# Patient Record
Sex: MALE | Race: ASIAN | NOT HISPANIC OR LATINO | Employment: OTHER | URBAN - METROPOLITAN AREA
[De-identification: names, ages, dates, MRNs, and addresses within clinical notes are randomized per-mention and may not be internally consistent; named-entity substitution may affect disease eponyms.]

---

## 2021-10-04 ENCOUNTER — WALK IN (OUTPATIENT)
Dept: URGENT CARE | Age: 76
End: 2021-10-04
Attending: FAMILY MEDICINE

## 2021-10-04 VITALS
HEART RATE: 58 BPM | DIASTOLIC BLOOD PRESSURE: 72 MMHG | TEMPERATURE: 97.6 F | RESPIRATION RATE: 16 BRPM | OXYGEN SATURATION: 97 % | WEIGHT: 176 LBS | SYSTOLIC BLOOD PRESSURE: 132 MMHG

## 2021-10-04 DIAGNOSIS — K08.89 TOOTHACHE: Primary | ICD-10-CM

## 2021-10-04 PROCEDURE — 99202 OFFICE O/P NEW SF 15 MIN: CPT

## 2021-10-04 RX ORDER — TRAMADOL HYDROCHLORIDE 50 MG/1
50 TABLET ORAL EVERY 8 HOURS PRN
Qty: 12 TABLET | Refills: 0 | Status: SHIPPED | OUTPATIENT
Start: 2021-10-04 | End: 2021-10-15 | Stop reason: ALTCHOICE

## 2021-10-04 RX ORDER — AMOXICILLIN 875 MG/1
875 TABLET, COATED ORAL 2 TIMES DAILY
Qty: 20 TABLET | Refills: 0 | Status: SHIPPED | OUTPATIENT
Start: 2021-10-04 | End: 2021-10-15 | Stop reason: ALTCHOICE

## 2021-10-04 ASSESSMENT — PAIN SCALES - GENERAL: PAINLEVEL: 8

## 2021-10-07 ENCOUNTER — APPOINTMENT (OUTPATIENT)
Dept: GENERAL RADIOLOGY | Facility: HOSPITAL | Age: 76
DRG: 872 | End: 2021-10-07
Attending: EMERGENCY MEDICINE
Payer: MEDICARE

## 2021-10-07 ENCOUNTER — HOSPITAL ENCOUNTER (INPATIENT)
Facility: HOSPITAL | Age: 76
LOS: 6 days | Discharge: HOME HEALTH | DRG: 872 | End: 2021-10-13
Attending: EMERGENCY MEDICINE | Admitting: HOSPITALIST
Payer: MEDICARE

## 2021-10-07 ENCOUNTER — APPOINTMENT (OUTPATIENT)
Dept: CT IMAGING | Facility: HOSPITAL | Age: 76
DRG: 872 | End: 2021-10-07
Attending: EMERGENCY MEDICINE
Payer: MEDICARE

## 2021-10-07 DIAGNOSIS — R50.9 FEVER, UNSPECIFIED: Primary | ICD-10-CM

## 2021-10-07 PROCEDURE — 70487 CT MAXILLOFACIAL W/DYE: CPT | Performed by: EMERGENCY MEDICINE

## 2021-10-07 PROCEDURE — 99223 1ST HOSP IP/OBS HIGH 75: CPT | Performed by: HOSPITALIST

## 2021-10-07 PROCEDURE — 70355 PANORAMIC X-RAY OF JAWS: CPT | Performed by: EMERGENCY MEDICINE

## 2021-10-07 PROCEDURE — 71045 X-RAY EXAM CHEST 1 VIEW: CPT | Performed by: EMERGENCY MEDICINE

## 2021-10-07 RX ORDER — HYDROCHLOROTHIAZIDE 12.5 MG/1
12.5 CAPSULE, GELATIN COATED ORAL DAILY
Status: DISCONTINUED | OUTPATIENT
Start: 2021-10-08 | End: 2021-10-13

## 2021-10-07 RX ORDER — KETOROLAC TROMETHAMINE 30 MG/ML
15 INJECTION, SOLUTION INTRAMUSCULAR; INTRAVENOUS ONCE
Status: COMPLETED | OUTPATIENT
Start: 2021-10-07 | End: 2021-10-07

## 2021-10-07 RX ORDER — GLIMEPIRIDE 1 MG/1
1 TABLET ORAL
COMMUNITY

## 2021-10-07 RX ORDER — METOPROLOL TARTRATE 50 MG/1
50 TABLET, FILM COATED ORAL
Status: DISCONTINUED | OUTPATIENT
Start: 2021-10-07 | End: 2021-10-07

## 2021-10-07 RX ORDER — BISOPROLOL FUMARATE 5 MG/1
5 TABLET ORAL DAILY
COMMUNITY

## 2021-10-07 RX ORDER — DEXTROSE AND SODIUM CHLORIDE 5; .45 G/100ML; G/100ML
INJECTION, SOLUTION INTRAVENOUS CONTINUOUS
Status: ACTIVE | OUTPATIENT
Start: 2021-10-07 | End: 2021-10-07

## 2021-10-07 RX ORDER — KETOROLAC TROMETHAMINE 30 MG/ML
30 INJECTION, SOLUTION INTRAMUSCULAR; INTRAVENOUS EVERY 6 HOURS PRN
Status: DISCONTINUED | OUTPATIENT
Start: 2021-10-07 | End: 2021-10-07 | Stop reason: DRUGHIGH

## 2021-10-07 RX ORDER — SIMVASTATIN 20 MG
20 TABLET ORAL NIGHTLY
COMMUNITY

## 2021-10-07 RX ORDER — ACETAMINOPHEN 500 MG
1000 TABLET ORAL ONCE
Status: COMPLETED | OUTPATIENT
Start: 2021-10-07 | End: 2021-10-07

## 2021-10-07 RX ORDER — ATORVASTATIN CALCIUM 10 MG/1
10 TABLET, FILM COATED ORAL NIGHTLY
Status: DISCONTINUED | OUTPATIENT
Start: 2021-10-07 | End: 2021-10-13

## 2021-10-07 RX ORDER — SODIUM PHOSPHATE, DIBASIC AND SODIUM PHOSPHATE, MONOBASIC 7; 19 G/133ML; G/133ML
1 ENEMA RECTAL ONCE AS NEEDED
Status: DISCONTINUED | OUTPATIENT
Start: 2021-10-07 | End: 2021-10-13

## 2021-10-07 RX ORDER — TRAMADOL HYDROCHLORIDE 50 MG/1
50 TABLET ORAL EVERY 8 HOURS PRN
Status: DISCONTINUED | OUTPATIENT
Start: 2021-10-07 | End: 2021-10-13

## 2021-10-07 RX ORDER — MORPHINE SULFATE 2 MG/ML
2 INJECTION, SOLUTION INTRAMUSCULAR; INTRAVENOUS ONCE
Status: COMPLETED | OUTPATIENT
Start: 2021-10-07 | End: 2021-10-07

## 2021-10-07 RX ORDER — HYDROCHLOROTHIAZIDE 25 MG/1
12.5 TABLET ORAL DAILY
Status: DISCONTINUED | OUTPATIENT
Start: 2021-10-08 | End: 2021-10-07 | Stop reason: SDUPTHER

## 2021-10-07 RX ORDER — ACETAMINOPHEN 325 MG/1
650 TABLET ORAL EVERY 4 HOURS PRN
Status: DISCONTINUED | OUTPATIENT
Start: 2021-10-07 | End: 2021-10-13

## 2021-10-07 RX ORDER — POLYETHYLENE GLYCOL 3350 17 G/17G
17 POWDER, FOR SOLUTION ORAL DAILY PRN
Status: DISCONTINUED | OUTPATIENT
Start: 2021-10-07 | End: 2021-10-13

## 2021-10-07 RX ORDER — BISACODYL 10 MG
10 SUPPOSITORY, RECTAL RECTAL
Status: DISCONTINUED | OUTPATIENT
Start: 2021-10-07 | End: 2021-10-13

## 2021-10-07 RX ORDER — ACETAMINOPHEN 325 MG/1
650 TABLET ORAL EVERY 6 HOURS PRN
Status: DISCONTINUED | OUTPATIENT
Start: 2021-10-07 | End: 2021-10-13

## 2021-10-07 RX ORDER — HYDROCHLOROTHIAZIDE 12.5 MG/1
12.5 TABLET ORAL DAILY
COMMUNITY

## 2021-10-07 RX ORDER — METOCLOPRAMIDE HYDROCHLORIDE 5 MG/ML
10 INJECTION INTRAMUSCULAR; INTRAVENOUS EVERY 8 HOURS PRN
Status: DISCONTINUED | OUTPATIENT
Start: 2021-10-07 | End: 2021-10-13

## 2021-10-07 RX ORDER — METOPROLOL TARTRATE 50 MG/1
50 TABLET, FILM COATED ORAL
Status: DISCONTINUED | OUTPATIENT
Start: 2021-10-08 | End: 2021-10-13

## 2021-10-07 RX ORDER — RABEPRAZOLE SODIUM 20 MG/1
20 TABLET, DELAYED RELEASE ORAL DAILY
COMMUNITY

## 2021-10-07 RX ORDER — SITAGLIPTIN AND METFORMIN HYDROCHLORIDE 50; 500 MG/1; MG/1
1 TABLET, FILM COATED ORAL DAILY
COMMUNITY

## 2021-10-07 RX ORDER — SODIUM CHLORIDE 9 MG/ML
INJECTION, SOLUTION INTRAVENOUS CONTINUOUS
Status: DISCONTINUED | OUTPATIENT
Start: 2021-10-07 | End: 2021-10-13

## 2021-10-07 RX ORDER — ONDANSETRON 2 MG/ML
4 INJECTION INTRAMUSCULAR; INTRAVENOUS EVERY 6 HOURS PRN
Status: DISCONTINUED | OUTPATIENT
Start: 2021-10-07 | End: 2021-10-13

## 2021-10-07 RX ORDER — MELATONIN
3 NIGHTLY PRN
Status: DISCONTINUED | OUTPATIENT
Start: 2021-10-07 | End: 2021-10-13

## 2021-10-07 RX ORDER — HYDROCODONE BITARTRATE AND ACETAMINOPHEN 5; 325 MG/1; MG/1
1 TABLET ORAL EVERY 4 HOURS PRN
Status: DISCONTINUED | OUTPATIENT
Start: 2021-10-07 | End: 2021-10-13

## 2021-10-07 RX ORDER — HYDROCODONE BITARTRATE AND ACETAMINOPHEN 5; 325 MG/1; MG/1
2 TABLET ORAL EVERY 4 HOURS PRN
Status: DISCONTINUED | OUTPATIENT
Start: 2021-10-07 | End: 2021-10-13

## 2021-10-07 RX ORDER — KETOROLAC TROMETHAMINE 30 MG/ML
15 INJECTION, SOLUTION INTRAMUSCULAR; INTRAVENOUS EVERY 6 HOURS PRN
Status: DISPENSED | OUTPATIENT
Start: 2021-10-07 | End: 2021-10-09

## 2021-10-07 RX ORDER — TRAMADOL HYDROCHLORIDE 50 MG/1
50 TABLET ORAL EVERY 8 HOURS PRN
COMMUNITY

## 2021-10-07 RX ORDER — PANTOPRAZOLE SODIUM 40 MG/1
40 TABLET, DELAYED RELEASE ORAL
Status: DISCONTINUED | OUTPATIENT
Start: 2021-10-08 | End: 2021-10-13

## 2021-10-07 RX ORDER — AMOXICILLIN 875 MG/1
875 TABLET, COATED ORAL 2 TIMES DAILY
COMMUNITY
End: 2021-10-13

## 2021-10-07 RX ORDER — DOCUSATE SODIUM 100 MG/1
100 CAPSULE, LIQUID FILLED ORAL 2 TIMES DAILY
Status: DISCONTINUED | OUTPATIENT
Start: 2021-10-07 | End: 2021-10-13

## 2021-10-07 ASSESSMENT — ENCOUNTER SYMPTOMS
EYE REDNESS: 0
NAUSEA: 0
FEVER: 0
SHORTNESS OF BREATH: 0
SINUS PRESSURE: 0
AGITATION: 0
HEADACHES: 0
DIZZINESS: 0
SORE THROAT: 0
ABDOMINAL PAIN: 0
COUGH: 0
VOMITING: 0
ADENOPATHY: 0

## 2021-10-07 NOTE — H&P
[facility]  History and Physical     Leon Rodas Patient Status:  Emergency    1945 MRN [id number]   Location [address] [facility] [address] [facility] [address] [facility] [address] Attending Estrada Coates, [address] [facility] Day # 0 PCP No primary care provider on file.      Olivia Rfl:   amoxicillin 875 MG Oral Tab, Take 875 mg by mouth 2 (two) times daily. , Disp: , Rfl:   bisoprolol 5 MG Oral Tab, Take 5 mg by mouth daily. , Disp: , Rfl:   simvastatin 20 MG Oral Tab, Take 20 mg by mouth nightly., Disp: , Rfl:   hydrochlorothiazide 1 *   K 3.7      CO2 26.0   ALKPHO 52   AST 25   ALT 24   BILT 1.4   TP 7.5       Estimated Creatinine Clearance: 56.6 mL/min (based on SCr of 1.22 mg/dL). No results for input(s): PTP, INR in the last 168 hours.     COVID-19 Lab Results    C

## 2021-10-07 NOTE — ED PROVIDER NOTES
Patient Seen in: [facility] Emergency Department      History   Patient presents with:  Altered Mental Status  Fever  Dental Problem    Stated Complaint: AMS- tooth infection, fever    Subjective:   HPI    This is a 78 male past medical for diabetes Pulse 61   Temp (!) 101.9 °F (38.8 °C) (Oral)   Resp 22   Ht 180.3 cm (5' 11\")   Wt 80.7 kg   SpO2 99%   BMI 24.83 kg/m²         Physical Exam    GENERAL: Awake, alert oriented x3, nontoxic appearing. SKIN: Hot, warm, and dry.   HEENT: Right-sided facial CBC W/ DIFFERENTIAL[[id number]]          Abnormal            Final result                 Please view results for these tests on the individual orders.    RAINBOW DRAW LAVENDER   RAINBOW DRAW LIGHT GREEN   RAINBOW DRAW GOLD   BLOOD CULTURE   BLOOD CULTU

## 2021-10-07 NOTE — ED INITIAL ASSESSMENT (HPI)
Family reports upper R tooth infection starting Monday. PT went to  and was started on antibiotics. Fever started today and became confused.  Family reports PT now thinking he is living at previous house, etc.

## 2021-10-08 ENCOUNTER — APPOINTMENT (OUTPATIENT)
Dept: CT IMAGING | Facility: HOSPITAL | Age: 76
DRG: 872 | End: 2021-10-08
Attending: INTERNAL MEDICINE
Payer: MEDICARE

## 2021-10-08 ENCOUNTER — APPOINTMENT (OUTPATIENT)
Dept: MRI IMAGING | Facility: HOSPITAL | Age: 76
DRG: 872 | End: 2021-10-08
Attending: Other
Payer: MEDICARE

## 2021-10-08 PROBLEM — R50.9 FEVER: Status: ACTIVE | Noted: 2021-10-07

## 2021-10-08 PROCEDURE — 99291 CRITICAL CARE FIRST HOUR: CPT | Performed by: INTERNAL MEDICINE

## 2021-10-08 PROCEDURE — 70450 CT HEAD/BRAIN W/O DYE: CPT | Performed by: INTERNAL MEDICINE

## 2021-10-08 PROCEDURE — 70551 MRI BRAIN STEM W/O DYE: CPT | Performed by: OTHER

## 2021-10-08 RX ORDER — BUTALBITAL, ACETAMINOPHEN AND CAFFEINE 50; 325; 40 MG/1; MG/1; MG/1
1 TABLET ORAL EVERY 4 HOURS PRN
Status: DISCONTINUED | OUTPATIENT
Start: 2021-10-08 | End: 2021-10-13

## 2021-10-08 RX ORDER — POTASSIUM CHLORIDE 20 MEQ/1
40 TABLET, EXTENDED RELEASE ORAL EVERY 4 HOURS
Status: COMPLETED | OUTPATIENT
Start: 2021-10-08 | End: 2021-10-08

## 2021-10-08 NOTE — PLAN OF CARE
NURSING ADMISSION NOTE      Patient admitted via Cart  Oriented to room. Safety precautions initiated. Bed in low position. Call light in reach. Pt received via cart, VSS stable, c/o headache 9/10. Daughters at bedside.

## 2021-10-08 NOTE — CONSULTS
INFECTIOUS DISEASE CONSULT NOTE    Isaac Owusu Patient Status:  Inpatient    1945 MRN [id number]   [facility] 4NW-A Attending Kristal Bradley MD   Hosp Day # 1 PCP No primary care p 3.375 g, Intravenous, Q8H  •  butalbital-acetaminophen-caffeine (FIORICET) per tab 1 tablet, 1 tablet, Oral, Q4H PRN  •  potassium chloride (K-DUR M20) CR tab 40 mEq, 40 mEq, Oral, Q4H  •  influenza vaccine (PF) (FLUZONE HD) high dose for 65 yrs & older in Negative for nasal congestion, sore throat  Respiratory: Negative for cough, wheezing  Cardiovascular: Negative for chest pain, dyspnea  Gastrointestinal: Negative for vomiting, diarrhea, abdominal pain  : Negative for hematuria or flank pain  Integument 10/07/21  1656   COLORUR Yellow   CLARITY Clear   SPECGRAVITY 1.014   GLUUR Negative   BILUR Negative   KETUR Negative   BLOODURINE Large*   PHURINE 5.0   PROUR 30 *   UROBILINOGEN <2.0   NITRITE Negative   LEUUR Negative   WBCUR 1-5   RBCUR 6-10*   BACUR the facial bones. 3D shaded surface renderings are created on an independent CT scanner workstation. Dose reduction techniques were used.  Dose information is transmitted to the ACR ([facility] [employer] [facility]) Shawn Bazzi [address] ([address]) wh no suspicious erosion within the mandible and maxilla. If there is concern for a tooth abscess, CT scan would be more sensitive and specific.    Dictated by (CST): Gume Adams MD on 10/07/2021 at 5:33 PM     Finalized by (CST): Gume Adams MD on 10/07/ recommendations based on his progress.     Alycia Martínez MD  10/8/2021  2:54 PM

## 2021-10-08 NOTE — CONSULTS
[facility] [address]   Neurology; INITIAL Consultation VISIT  10/8/2021, 10:23 AM     Dang Masters Patient Status:  Inpatient    1945 MRN [id number]   [facility] 4NW-A Attending Otilio Dye MD     PCP No primary care p previous alcohol use. He reports previous drug use. ALLERGIES:  No Known Allergies    MEDICATIONS:  No current facility-administered medications on file prior to encounter.   traMADol 50 MG Oral Tab, Take 50 mg by mouth every 8 (eight) hours as needed fo edema      Neurologic Examination:  Alert with fluent speech but spoke with him via an  relatives  Oriented  Comprehension is intact  CN grossly normal  No motor or sensory lateralizing  No drift or asterixis  DTRs hyporeflexic  FTN normal  GAit CBC W/ DIFFERENTIAL    Collection Time: 10/07/21  4:24 PM   Result Value Ref Range    WBC 9.9 4.0 - 11.0 x10(3) uL    RBC 4.19 3.80 - 5.80 x10(6)uL    HGB 12.1 (L) 13.0 - 17.5 g/dL    HCT 36.0 (L) 39.0 - 53.0 %    .0 150.0 - 450.0 10(3)uL    MCV 8 Yeast Urine None Seen None Seen /HPF   EKG 12 Lead    Collection Time: 10/08/21  5:22 AM   Result Value Ref Range    Ventricular rate 61 BPM    Atrial rate 61 BPM    P-R Interval 172 ms    QRS Duration 90 ms    Q-T Interval 398 ms    QTC Calculation (Bezet (40558)    Result Date: 10/8/2021  CONCLUSION: 1. No acute intracranial findings 2. Cerebral atrophy with small vessel changes.    Dictated by (CST): Malini Swartz MD on 10/08/2021 at 9:17 AM     Finalized by (CST): Malini Swartz MD on 10/08/2021 at 9

## 2021-10-08 NOTE — PROGRESS NOTES
[id number] pt dtr pt feels warm, recheck temp 101.9. Tylenol given, ice packs under axilla. Ekg done d/t dtr saying pt c/o chest heaviness. VS stable other than elevated temperature. EKG NSR. C/O headache tramadol given. Belched and statesd he feels better.

## 2021-10-08 NOTE — PROGRESS NOTES
[facility] Pharmacy Note:  Age Based Dose Adjustment    Evie Cuenca has been prescribed ketorolac (TORADOL) 30 mg IV every 6 hours prn. Patient is >71 years old therefore the dose has been adjusted to 15 mg IV every 6 hours prn.       Thank you,  Luda Michelle

## 2021-10-08 NOTE — CODE DOCUMENTATION
[facility]  RAPID RESPONSE NOTE     Mindy Laming Patient Status:  Inpatient    1945 MRN [id number]   [facility] 4NW-A Attending Prachi Cordero MD   Hosp Day # 1 PCP No primary care provider on file.      Reason for RRT: Chest pain 3-D RENDERING:  Not applicable       PATIENT STATED HISTORY:(As transcribed by Technologist) Román Ross has right facial pain with fever and AMS.         CONTRAST USED:  75cc of Omnipaque 350       FINDINGS:     SINUSES:  Mucous retention cyst or polyp in t rapid response.     Critical care time: 35 Minutes of critical care time spent from 6:13 AM to 6:48 AM    Dr. Philip Shankar will follow from morning today    Gavin Turner MD  10/8/2021

## 2021-10-08 NOTE — PAYOR COMM NOTE
--------------  ADMISSION REVIEW     Payor: [id number] #:  [id number]  Authorization Number: [id number]    Admit date: 10/7/21  Admit time:  6:59 PM       REVIEW DOCUMENTATION:     ED Provider Notes      ED Provider Notes signed by Sc use: Not Currently    Drug use: Not Currently             Review of Systems    Positive for stated complaint: AMS- tooth infection, fever  Other systems are as noted in HPI. Constitutional and vital signs reviewed.       All other systems reviewed and nega PROCALCITONIN - Normal    Narrative:     Resulted by: batch: K, CO2, CL, NA, ALB, TBIL, ALT, AST, ALP, CA, CREA, BUN, GLUC,    LACTIC ACID, PLASMA - Normal   RAPID SARS-COV-2 BY PCR - Normal   CBC WITH DIFFERENTIAL WITH PLATELET    Narrative:      The fol pm    Follow-up:  No follow-up provider specified.         Medications Prescribed:  Current Discharge Medication List                          Hospital Problems             Present on Admission           ICD-10-CM Noted POA    * (Principal) Fever, unspecifi syncope. Past Medical History:  Past Medical History:   Diagnosis Date   • Diabetes ([facility] [address]    • Essential hypertension    • Hyperlipidemia         Past Surgical History: History reviewed. No pertinent surgical history.     Social History:  reports that he S1, S2. Regular rate and rhythm. No murmurs, rubs or gallops. Equal pulses. Chest and Back: No tenderness or deformity. Abdomen: Soft, nontender, nondistended. Positive bowel sounds. No rebound, guarding or organomegaly.   Neurologic: No focal neurologi isolation: Yes    Plan of care discussed with patient at bedside.     Cornelius Vazquez DO  10/7/2021                      Electronically signed by Pascual Maldonado DO on 10/8/2021 12:57 AM         MEDICATIONS ADMINISTERED IN LAST 1 DAY: Date Action Dose Route User    10/8/2021 0511 Given  Intravenous Maryjo Bella RN      pantoprazole (PROTONIX) EC tab 40 mg     Date Action Dose Route User    10/8/2021 0523 Given  Oral Maryjo Bella RN      Piperacillin Sod-Tazobactam So (Aleta Goodness) 3.    Reason for RRT: Chest pain, headache, fever     Rapid response called by RN for fever of 101.9 °F with headache and chest pain. Chest pain relieved by burping according to patient. Denies any diaphoresis. Complains of nausea.  Complains of intractable FINDINGS:     SINUSES:  Mucous retention cyst or polyp in the right maxillary sinus.  There is minimal mucosal thickening of the ethmoid air cells.  The remainder of the paranasal sinuses are clear.    NASAL FOSSA:  No mass, fracture, or significant septa patient relates that about this weekend he was chewing on a tough beef jerky and felt pain in the right molar. It was found to be possibly infected at an urgent care on Monday and was given antibiotics and tramadol for pain.   The right side of the jaw and Ref Range     Rapid SARS-CoV-2 by PCR Not Detected Not Detected   RAINBOW DRAW LAVENDER     Collection Time: 10/07/21  4:24 PM   Result Value Ref Range     Hold Lavender Auto Resulted     RAINBOW DRAW LIGHT GREEN     Collection Time: 10/07/21  4:24 PM   Re Basophil Absolute 0.08 0.00 - 0.20 x10(3) uL     Immature Granulocyte Absolute 0.06 0.00 - 1.00 x10(3) uL     Neutrophil % 68.7 %     Lymphocyte % 17.5 %     Monocyte % 10.7 %     Eosinophil % 1.7 %     Basophil % 0.8 %     Immature Granulocyte % 0.6 %   E CO2 27.0 21.0 - 32.0 mmol/L     Anion Gap 5 0 - 18 mmol/L     BUN 17 7 - 18 mg/dL     Creatinine 1.10 0.70 - 1.30 mg/dL     Calcium, Total 7.5 (L) 8.5 - 10.1 mg/dL     Calculated Osmolality 284 275 - 295 mOsm/kg     GFR, Non- 66 >=60     GF expressed willingness to get LP     MRI to be ordered as well  Just plain Tylenol ES for headache for now

## 2021-10-09 ENCOUNTER — APPOINTMENT (OUTPATIENT)
Dept: GENERAL RADIOLOGY | Facility: HOSPITAL | Age: 76
DRG: 872 | End: 2021-10-09
Attending: Other
Payer: MEDICARE

## 2021-10-09 PROCEDURE — 009U3ZX DRAINAGE OF SPINAL CANAL, PERCUTANEOUS APPROACH, DIAGNOSTIC: ICD-10-PCS | Performed by: HOSPITALIST

## 2021-10-09 PROCEDURE — 62328 DX LMBR SPI PNXR W/FLUOR/CT: CPT | Performed by: OTHER

## 2021-10-09 RX ORDER — DEXTROSE MONOHYDRATE 25 G/50ML
50 INJECTION, SOLUTION INTRAVENOUS
Status: DISCONTINUED | OUTPATIENT
Start: 2021-10-09 | End: 2021-10-13

## 2021-10-09 NOTE — PROGRESS NOTES
[facility]     Hospitalist Progress Note     [facility] Main Patient Status:  Inpatient    1945 MRN [id number]   [facility] 4NW-A Attending Bushra Lieberman MD   Hosp Day # 2 PCP No primary care provider on file.      Chief Complaint: Annalisa Haro Markers  No results for input(s): CRP, SU, LDH, DDIMER in the last 168 hours. Imaging: Imaging data reviewed in Epic.     Medications:   • Insulin Aspart Pen  1-5 Units Subcutaneous TID CC and HS   • ampicillin-sulbactam  3 g Intravenous Q8H   • pantopr

## 2021-10-09 NOTE — PLAN OF CARE
Pt alert and oriented x4, confused at times per family, c/o pain to the right face, prn given per MAR, applied ice pack for comfort. IVF infusing and receiving IVabx. daughter at bedside. Will monitor.

## 2021-10-09 NOTE — PROGRESS NOTES
[id number] [facility] Neurology Progress Note    Isaac Owusu Patient Status:  Inpatient    1945 MRN [id number]   [facility] 4NW-A Attending Kristal Bradley MD   Hosp Day # 2 PCP No primary care provider on file.      NEUROLOGY   Attend 9:09 AM   Result Value Ref Range    POC Glucose 115 (H) 70 - 99 mg/dL   Cell Count, CSF    Collection Time: 10/09/21 11:31 AM   Result Value Ref Range    Total Volume CSF 8.5 mL    CSF TUBE # 4     Color CSF Colorless Colorless    Clarity CSF Clear Clear vs parameningeal phenomena secondary to odontogenic infection.   Definitely not consistent with Bacterial infection      Discussion/Recommendations:     Continue antibiotic coverage for dental issue  ID following    Discussed case with care team  Agree with Intravenous, Continuous  acetaminophen (TYLENOL) tab 650 mg, 650 mg, Oral, Q6H PRN  acetaminophen (TYLENOL) tab 650 mg, 650 mg, Oral, Q4H PRN   Or  HYDROcodone-acetaminophen (NORCO) 5-325 MG per tab 1 tablet, 1 tablet, Oral, Q4H PRN   Or  HYDROcodone-aceta Intact to light touch  Gait: Deferred      Imaging/Diagnostics:  MRI Brain 10/8/21:  CONCLUSION:     1. Examination is incomplete.  The patient was unable to tolerate the exam.   2. No evidence of an acute infarct.    3. Minimal FLAIR abnormalities the whit

## 2021-10-09 NOTE — PLAN OF CARE
Assumed care @ 0730. Patient c/o moderate-severe headache. Temp max 101.1. Patient alert, oriented x4. Dr. Rehana Cadet notified regarding fever, Tylenol gvien as needed. Lumbar puncture done in IR, procedure tolerated well, puncture site clean, dry and intact.  P

## 2021-10-09 NOTE — PROGRESS NOTES
INFECTIOUS DISEASE PROGRESS NOTE    Dang Masters Patient Status:  Inpatient    1945 MRN [id number]   [facility] 4NW-A Attending Otilio Dye MD   Hosp Day # 2 PCP No primary care HYDROcodone-acetaminophen (NORCO) 5-325 MG per tab 2 tablet, 2 tablet, Oral, Q4H PRN  •  ondansetron (ZOFRAN) injection 4 mg, 4 mg, Intravenous, Q6H PRN  •  metoclopramide (REGLAN) injection 10 mg, 10 mg, Intravenous, Q8H PRN  •  docusate sodium (COLACE) c 4. 19 3.64*   HGB 12.1* 10.8*   HCT 36.0* 30.5*   MCV 85.9 83.8   MCH 28.9 29.7   MCHC 33.6 35.4   RDW 12.0 12.1   NEPRELIM 6.81 5.66   WBC 9.9 8.6   .0 156.0     Recent Labs   Lab 10/07/21  1624 10/08/21  0614 10/08/21  2132   GLU 99 114*  --    BUN HISTORY: (As transcribed by Technologist)  Per patients family member he had a bridge installed pt bit down and injured himself, he now has an infection in his mouth. FINDINGS:  There is cerebral atrophy with symmetric prominence of the ventricles.  Ther lesion or fracture  ORBITS:  No visible mass, hematoma, edema or fracture. CAVERNOUS SINUS:  Symmetric appearance with no visible lesion. SALIVARY GLANDS:  The parotid and submandibular glands are unremarkable.   OTHER:  The nasopharynx, oropharynx, and o congestion along with interstitial abnormalities the lungs are noted. This can be due to fluid overload. Atypical infectious process is also of consideration.     Dictated by (CST): Tom Francois MD on 10/07/2021 at 5:19 PM     Finalized by (CST): Betzaida

## 2021-10-10 RX ORDER — METOPROLOL TARTRATE 50 MG/1
50 TABLET, FILM COATED ORAL
Status: DISCONTINUED | OUTPATIENT
Start: 2021-10-10 | End: 2021-10-10

## 2021-10-10 RX ORDER — HYDROCHLOROTHIAZIDE 12.5 MG/1
12.5 CAPSULE, GELATIN COATED ORAL DAILY
Status: DISCONTINUED | OUTPATIENT
Start: 2021-10-10 | End: 2021-10-10

## 2021-10-10 RX ORDER — AMLODIPINE BESYLATE 5 MG/1
10 TABLET ORAL DAILY
Status: DISCONTINUED | OUTPATIENT
Start: 2021-10-10 | End: 2021-10-13

## 2021-10-10 RX ORDER — NAPROXEN 500 MG/1
500 TABLET ORAL 2 TIMES DAILY PRN
Status: DISCONTINUED | OUTPATIENT
Start: 2021-10-10 | End: 2021-10-13

## 2021-10-10 NOTE — PLAN OF CARE
Patient slightly drowsy, easily arousable, oriented x4. Patient c/o severe pain on back of the head, Tylenol given as needed, with mild relief. 'K-240'Z mmHg systolic, Dr. Amanda Diaz notified, Norvasc started. Patient with poor oral intake.  Small blister

## 2021-10-10 NOTE — PROGRESS NOTES
[facility]     Hospitalist Progress Note     Kris Frazier Patient Status:  Inpatient    1945 MRN [id number]   [facility] 4NW-A Attending Loyd Parra MD   Hosp Day # 3 PCP No primary care provider on file.      Chief Complaint: fev input(s): CK in the last 168 hours. Inflammatory Markers  No results for input(s): CRP, SU, LDH, DDIMER in the last 168 hours. Imaging: Imaging data reviewed in Epic.     Medications:   • Insulin Aspart Pen  1-5 Units Subcutaneous TID CC and HS   • a

## 2021-10-10 NOTE — PLAN OF CARE
Pt alert and oriented x4, vitals stable, afebrile, c/o headache, prn tylenol given per MAR. IVF infusing, continued IV abx, daughter at bedside, will monitor.

## 2021-10-10 NOTE — PROGRESS NOTES
[facility]  Neurology  Notes  Affiliated with [facility]  10/10/2021, 10:02 AM     Kris Frazier Patient Status:  Inpatient    1945 MRN [id number]   [facility] 4NW-A Attending Loyd Parra MD   [facility] at 10/09/21 1525       Objective:  /58 (BP Location: Left arm)   Pulse 52   Temp 98.2 °F (36.8 °C) (Oral)   Resp 18   Ht 71\"   Wt 183 lb 4.8 oz (83.1 kg)   SpO2 98%   BMI 25.57 kg/m²      Intake/Output Summary (Last 24 hours) at 10/10/2021 1002  Las AM   Result Value Ref Range    Neutrophils CSF 2 %    Lymphocytes CSF 47 %    Mono/Macrophages CSF 51 %    Eosinophils CSF 0 %    Basophil CSF 0 %    Total Cells Counted 100    PATH COMMENT CSF    Collection Time: 10/09/21 11:31 AM   Result Value Ref Range

## 2021-10-10 NOTE — PROGRESS NOTES
INFECTIOUS DISEASE PROGRESS NOTE    Niharika Frost Patient Status:  Inpatient    1945 MRN [id number]   [facility] 4NW-A Attending Loida Benson MD   Hosp Day # 3 PCP No primary care HYDROcodone-acetaminophen (NORCO) 5-325 MG per tab 2 tablet, 2 tablet, Oral, Q4H PRN  •  ondansetron (ZOFRAN) injection 4 mg, 4 mg, Intravenous, Q6H PRN  •  metoclopramide (REGLAN) injection 10 mg, 10 mg, Intravenous, Q8H PRN  •  docusate sodium (COLACE) c 10/08/21  0614 10/08/21  2132   GLU 99 114*  --    BUN 14 17  --    CREATSERUM 1.22 1.10  --    GFRAA 67 76  --    GFRNAA 58* 66  --    CA 8.3* 7.5*  --    ALB 3.1*  --   --    * 136  --    K 3.7 3.2* 3.9    104  --    CO2 26.0 27.0  --    ALKP his mouth. FINDINGS:  There is cerebral atrophy with symmetric prominence of the ventricles.  There are patchy areas of low attenuation in the periventricular and deep white matter which are nonspecific but most consistent with small vessel ischemic vleasco The parotid and submandibular glands are unremarkable. OTHER:  The nasopharynx, oropharynx, and oral cavity are unremarkable. No lymphadenopathy. No enhancing lesion identified.              CONCLUSION:  There is no evidence of abscess or definite osteom Dictated by (CST): Michelle Kennedy MD on 10/07/2021 at 5:19 PM     Finalized by (CST): Michelle Kennedy MD on 10/07/2021 at 5:20 PM            ASSESSMENT:  1. R odontogenic infection- no jace abscess per imaging but does not rule it out  a.  Had bee

## 2021-10-11 RX ORDER — METOCLOPRAMIDE HYDROCHLORIDE 5 MG/ML
10 INJECTION INTRAMUSCULAR; INTRAVENOUS ONCE
Status: COMPLETED | OUTPATIENT
Start: 2021-10-11 | End: 2021-10-11

## 2021-10-11 RX ORDER — DIPHENHYDRAMINE HYDROCHLORIDE 50 MG/ML
25 INJECTION INTRAMUSCULAR; INTRAVENOUS ONCE
Status: COMPLETED | OUTPATIENT
Start: 2021-10-11 | End: 2021-10-11

## 2021-10-11 NOTE — PLAN OF CARE
Pt A&O x4. Pt denies difficulty breathing. C/o headache, R ear pain. Pt states that laying flat improves pain. Neuro saw pt today, recommending anesthesia to see pt for possible blood patch. Benadryl and reglan given for headache per neuro.  IV abx and IV a INTERVENTIONS:  - Encourage pt to monitor pain and request assistance  - Assess pain using appropriate pain scale  - Administer analgesics based on type and severity of pain and evaluate response  - Implement non-pharmacological measures as appropriate and

## 2021-10-11 NOTE — PLAN OF CARE
Pt alert and oriented x4, vitals stable, c/o headache when sitting, relief when lying down per family. Neuro aware, per Dr Rocío Berry to keep pt NPO for procedure in am. IVF infusing, continue IVabx, family at bedside, will monitor closely.

## 2021-10-11 NOTE — PROGRESS NOTES
INFECTIOUS DISEASE PROGRESS NOTE    Colveraraseli Smith Patient Status:  Inpatient    1945 MRN [id number]   [facility] 4NW-A Attending Mikel Shetty MD   Hosp Day # 4 PCP No primary care mg, Oral, Q4H PRN **OR** HYDROcodone-acetaminophen (NORCO) 5-325 MG per tab 1 tablet, 1 tablet, Oral, Q4H PRN **OR** HYDROcodone-acetaminophen (NORCO) 5-325 MG per tab 2 tablet, 2 tablet, Oral, Q4H PRN  •  ondansetron (ZOFRAN) injection 4 mg, 4 mg, Madhu Lux 12.0 12.1   NEPRELIM 6.81 5.66   WBC 9.9 8.6   .0 156.0     Recent Labs   Lab 10/07/21  1624 10/08/21  0614 10/08/21  2132   GLU 99 114*  --    BUN 14 17  --    CREATSERUM 1.22 1.10  --    GFRAA 67 76  --    GFRNAA 58* 66  --    CA 8.3* 7.5*  -- member he had a bridge installed pt bit down and injured himself, he now has an infection in his mouth. FINDINGS:  There is cerebral atrophy with symmetric prominence of the ventricles.  There are patchy areas of low attenuation in the periventricular an fracture. CAVERNOUS SINUS:  Symmetric appearance with no visible lesion. SALIVARY GLANDS:  The parotid and submandibular glands are unremarkable. OTHER:  The nasopharynx, oropharynx, and oral cavity are unremarkable. No lymphadenopathy.   No enhancing l noted.  This can be due to fluid overload. Atypical infectious process is also of consideration.     Dictated by (CST): Raheem Rogel MD on 10/07/2021 at 5:19 PM     Finalized by (CST): Raheem Rogel MD on 10/07/2021 at 5:20 PM            ASSESSM

## 2021-10-11 NOTE — PROGRESS NOTES
[facility]     Hospitalist Progress Note     Niharika Frost Patient Status:  Inpatient    1945 MRN [id number]   [facility] 4NW-A Attending Loida Benson MD   Hosp Day # 4 PCP No primary care provider on file.      Chief Complaint: con last 168 hours. Inflammatory Markers  No results for input(s): CRP, SU, LDH, DDIMER in the last 168 hours. Imaging: Imaging data reviewed in Epic.     Medications:   • amLODIPine  10 mg Oral Daily   • Insulin Aspart Pen  1-5 Units Subcutaneous TID CC

## 2021-10-11 NOTE — PROGRESS NOTES
[id number] [facility] Neurology Progress Note    Ben Ruiz Patient Status:  Inpatient    1945 MRN [id number]   [facility] 4NW-A Attending Milka Ventura MD   Hosp Day # 4 PCP No primary care provider on file.        Chief Complaint: Component Value Date    [facility]              Imaging/Diagnostic:    • amLODIPine  10 mg Oral Daily   • Insulin Aspart Pen  1-5 Units Subcutaneous TID CC and HS   • acyclovir  10 mg/kg (Ideal) Intravenous Q8H   • ampicillin-sulbactam  3 g Intr

## 2021-10-12 ENCOUNTER — TELEPHONE (OUTPATIENT)
Dept: SCHEDULING | Age: 76
End: 2021-10-12

## 2021-10-12 ENCOUNTER — APPOINTMENT (OUTPATIENT)
Dept: CT IMAGING | Facility: HOSPITAL | Age: 76
DRG: 872 | End: 2021-10-12
Attending: HOSPITALIST
Payer: MEDICARE

## 2021-10-12 PROCEDURE — 70450 CT HEAD/BRAIN W/O DYE: CPT | Performed by: HOSPITALIST

## 2021-10-12 RX ORDER — ACETAMINOPHEN 325 MG/1
650 TABLET ORAL EVERY 4 HOURS PRN
Qty: 30 TABLET | Refills: 0 | Status: SHIPPED | OUTPATIENT
Start: 2021-10-12

## 2021-10-12 RX ORDER — AMOXICILLIN AND CLAVULANATE POTASSIUM 875; 125 MG/1; MG/1
1 TABLET, FILM COATED ORAL 2 TIMES DAILY
Qty: 20 TABLET | Refills: 0 | Status: SHIPPED | OUTPATIENT
Start: 2021-10-12 | End: 2021-10-22

## 2021-10-12 NOTE — PROGRESS NOTES
[id number] [facility] Neurology Progress Note    Roxann Calabrese Patient Status:  Inpatient    1945 MRN [id number]   [facility] 4NW-A Attending Paula Cherry MD   Hosp Day # 5 PCP No primary care provider on file.        Chief Complaint: Imaging/Diagnostic:    • amLODIPine  10 mg Oral Daily   • Insulin Aspart Pen  1-5 Units Subcutaneous TID CC and HS   • acyclovir  10 mg/kg (Ideal) Intravenous Q8H   • ampicillin-sulbactam  3 g Intravenous Q8H   • pantoprazole  40 mg Oral QAM AC   •

## 2021-10-12 NOTE — CDS QUERY
Uncertain Diagnosis  CLINICAL DOCUMENTATION CLARIFICATION FORM  Dear Doctor:  Clinical information (provided below) indicates an unknown status of a documented diagnosis.  For accurate ICD-10-CM code assignment to reflect severity of illness and risk of mor sepsis due to above  5. HA- assume due to #1  10/9 PN: Dental infection-we will continue on Unasyn. CT scan negative for abscess orosteomyelitis. 10/10 PN Moy Herrera): 1.R odontogenic infection- no jace abscess per imaging but does not rule it out a.  Had been

## 2021-10-12 NOTE — DISCHARGE SUMMARY
[facility] HOSPITALIST  DISCHARGE SUMMARY     Anderson Bullard Patient Status:  Inpatient    1945 MRN [id number]   [facility] 4NW-A Attending Ange Rider MD   Hosp Day # 5 PCP No primary care provider on file.      Date of Admission: 10/7/2021 SITagliptin-metFORMIN HCl      Take 1 tablet by mouth daily. Refills: 0     metFORMIN 500 MG Tabs  Commonly known as: GLUCOPHAGE      Take 500 mg by mouth 2 (two) times daily with meals.    Refills: 0     RABEprazole Sodium 20 MG Tbec  Commonly known as:

## 2021-10-12 NOTE — PROGRESS NOTES
Pt Axox4. Lying in bed prefers to be flat, helps with pain. C/O headache 5/10 tylenol given, asleep. Per daughter, headache is better. IV abx and antivral given per [facility]. Using urinal to void. No complaints.

## 2021-10-12 NOTE — PROGRESS NOTES
INFECTIOUS DISEASE PROGRESS NOTE    Kris Frazier Patient Status:  Inpatient    1945 MRN [id number]   [facility] 4NW-A Attending Loyd Parra MD   Hosp Day # 5 PCP No primary care MG per tab 1 tablet, 1 tablet, Oral, Q4H PRN **OR** HYDROcodone-acetaminophen (NORCO) 5-325 MG per tab 2 tablet, 2 tablet, Oral, Q4H PRN  •  ondansetron (ZOFRAN) injection 4 mg, 4 mg, Intravenous, Q6H PRN  •  metoclopramide (REGLAN) injection 10 mg, 10 mg, Recent Labs   Lab 10/07/21  1624 10/08/21  0614 10/08/21  2132   GLU 99 114*  --    BUN 14 17  --    CREATSERUM 1.22 1.10  --    GFRAA 67 76  --    GFRNAA 58* 66  --    CA 8.3* 7.5*  --    ALB 3.1*  --   --    * 136  --    K 3.7 3.2* 3.9    injured himself, he now has an infection in his mouth. FINDINGS:  There is cerebral atrophy with symmetric prominence of the ventricles.  There are patchy areas of low attenuation in the periventricular and deep white matter which are nonspecific but mos appearance with no visible lesion. SALIVARY GLANDS:  The parotid and submandibular glands are unremarkable. OTHER:  The nasopharynx, oropharynx, and oral cavity are unremarkable. No lymphadenopathy. No enhancing lesion identified.              [facility] Atypical infectious process is also of consideration.     Dictated by (CST): Melody Nicole MD on 10/07/2021 at 5:19 PM     Finalized by (CST): Melody Nicole MD on 10/07/2021 at 5:20 PM            ASSESSMENT:  1. R odontogenic infection- no jace a

## 2021-10-12 NOTE — PAYOR COMM NOTE
--------------  CONTINUED STAY REVIEW    Payor: [id number] #:  [id number]  Authorization Number: [id number]    Admit date: 10/7/21  Admit time:  6:59 PM    Admitting Physician: Cristofer Romo MD  Attending Physician:  MD ANTONIO Urias Route User    10/12/2021 1125 Given  Oral Axel Ricci RN    10/11/2021 1701 Given  Oral Jessica Jacinto RN      pantoprazole (PROTONIX) EC tab 40 mg     Date Action Dose Route User    10/12/2021 6620 Given  Oral Mir Damon RN      0.9% NaCl infu sitting up since yesterday. No more fevers. No dental pain, although reports that his whole head hurst a times, difficult to say if it is the head or the mandible.  MS has been nl             ASSESSMENT:  1. R odontogenic infection- no jace abscess per munir nonsensical speech noted, mouth is dry         Objective/Physical Exam:     Vital Signs:  Blood pressure 149/69, pulse 50, temperature 97.8 °F (36.6 °C), temperature source Oral, resp.  rate 18, height 71\", weight 183 lb 4.8 oz (83.1 kg), SpO2 97 %.     Ge subsequently noted facial swelling and tooth pain.  He presented to the ED for fever of 101, confusion and headache         Current complaints: family at bedside, reports his headache is controlled at a 2/10 with naproxen while in bed, he has some increase reglan 10 mg IV for his headache x1 given on 10/12 - family reports no relief of headache with this   Tylenol and Naproxen prn headache     Headache 2/10 controlled with naproxen   Discussed with Dr Daryl Mares   neurology will sign off , please call with any qu

## 2021-10-13 VITALS
RESPIRATION RATE: 18 BRPM | HEIGHT: 71 IN | WEIGHT: 183.31 LBS | BODY MASS INDEX: 25.66 KG/M2 | DIASTOLIC BLOOD PRESSURE: 77 MMHG | SYSTOLIC BLOOD PRESSURE: 182 MMHG | HEART RATE: 54 BPM | OXYGEN SATURATION: 97 % | TEMPERATURE: 97 F

## 2021-10-13 RX ORDER — AMLODIPINE BESYLATE 10 MG/1
10 TABLET ORAL DAILY
Qty: 30 TABLET | Refills: 0 | Status: SHIPPED | OUTPATIENT
Start: 2021-10-14

## 2021-10-13 NOTE — PROGRESS NOTES
[facility]     Hospitalist Progress Note     Bennie Gains Patient Status:  Inpatient    1945 MRN [id number]   [facility] 4NW-A Attending Rex Canada MD   Hosp Day # 6 PCP No primary care provider on file.      Chief Complaint: 10/07/21  1624   PCT 0.05       Cardiac  Recent Labs   Lab 10/08/21  0614   TROP <0.045       Creatinine Kinase  No results for input(s): CK in the last 168 hours.     Inflammatory Markers  Recent Labs   Lab 10/12/21  1721   CRP 0.64*       Imaging: Imaging

## 2021-10-13 NOTE — PLAN OF CARE
Patient received this evening alert and oriented x 4, daughter at bedside, lungs clear on room air, abdomen soft, bowel sounds present, voiding adequate amounts, headache 4/10 while sitting up at side of bed, IVF infusing, blood pressure elevated, will con

## 2021-10-13 NOTE — PROGRESS NOTES
Left perfect serve message for Dr Fay Moreno, for family member states, hes a DR Lukas Gaytan, and wants to be updated by md. Also left message with Dr Fay Moreno in regards to hypertnesion in 160's, but patient heart rate 55, so lopressor held, and wanted to see if we coul

## 2021-10-13 NOTE — PROGRESS NOTES
[id number] [facility] Neurology Progress Note    Bharati Pontiff Patient Status:  Inpatient    1945 MRN [id number]   [facility] 4NW-A Attending Samia Camacho MD   Hosp Day # 6 PCP No primary care provider on file.      CC: fever, HA, Intravenous, Continuous  acetaminophen (TYLENOL) tab 650 mg, 650 mg, Oral, Q6H PRN  acetaminophen (TYLENOL) tab 650 mg, 650 mg, Oral, Q4H PRN   Or  HYDROcodone-acetaminophen (NORCO) 5-325 MG per tab 1 tablet, 1 tablet, Oral, Q4H PRN   Or  HYDROcodone-aceta today      Imaging/Diagnostics:  [address] [place] [address] 10/12/21: CONCLUSION:  No acute intracranial pathology.       Labs:  Serum:  ESR 41  CRP 0.64  B12 315  TPO ab neg    CSF:     Enterovirus, WNV, neg in CSF      Assessment:   Headache, fevers  Mild CSF pleocytosis, possi

## 2021-10-13 NOTE — PHYSICAL THERAPY NOTE
PHYSICAL THERAPY EVALUATION - INPATIENT     Room Number: 328/108-I  Evaluation Date: 10/13/2021  Type of Evaluation: Initial  Physician Order: PT Eval and Treat    Presenting Problem: fever, AMS  Reason for Therapy: Mobility Dysfunction and Discharge Unable to rate  Location: neck  Management Techniques:  Activity promotion;Repositioning    COGNITION  · Following Commands:  follows one step commands with increased time and follows one step commands with repetition  · Initiation: cues to initiate tasks needed. Pt also very [place]. Pt supine-sit slowly with supervision. Pt demonstrates fair static/dynamic sitting balance at EOB. Pt educated on sequencing with RW. Pt sit-stand with RW and MIN assist for correction of retropulsion.  Pt ambulated 200ft with RW a further address above mentioned impairments and functional mobility deficits in order to return to independent prior level of function.    DISCHARGE RECOMMENDATIONS  PT Discharge Recommendations: Home with home health PT/OT    PLAN  PT Treatment Plan: Bed m

## 2021-10-13 NOTE — DISCHARGE SUMMARY
[facility] [place] HOSPITALIST  DISCHARGE SUMMARY     Wes Carrizales Patient Status:  Inpatient    1945 MRN [id number]   [facility] 4NW-A Attending Benito Schafer MD   Hosp Day # 6 PCP No primary care provider on file.      Date of Admission: 10/7/2 Follow-Up Recommendation:  LACE 29-58:  Moderate Risk of readmission after discharge from the hospital.    Procedures during hospitalization:   • LP    Incidental or significant findings and recommendations (brief descriptions):  • none    Lab/Test results Pain.   Refills: 0        STOP taking these medications    amoxicillin 875 MG Tabs  Commonly known as: AMOXIL              Where to Get Your Medications      Please  your prescriptions at the location directed by your doctor or nurse    Bring a qing

## 2021-10-13 NOTE — CM/SW NOTE
[facility] accepted    [address] [facility], [address] [place] [address]  Phone: [phone]  Fax: [phone]

## 2021-10-13 NOTE — PLAN OF CARE
NURSING DISCHARGE NOTE    Discharged Home via Wheelchair. Accompanied by Family member and Support staff  Belongings Taken by patient/family. Discharge instructions given and explained. Pt and family verbalized understanding. PIV removed.

## 2021-10-13 NOTE — PROGRESS NOTES
Patient alert oriented times four, patient prefers to lie in flat in bed, for helps with headaches. Notified neurology that family is concerned that speech is slurred, neurology said  that speech has been slurred for two weeks.  Patient able to make strong

## 2021-10-13 NOTE — CM/SW NOTE
10/13/21 1000   CM/SW Referral Data   Referral Source Social Work (self-referral)   Reason for Referral Discharge planning   Informant   (sin in law)   Patient [address]   Number of Levels in Home 2   Patient lives with Talisha

## 2021-10-14 ENCOUNTER — PATIENT OUTREACH (OUTPATIENT)
Dept: CASE MANAGEMENT | Age: 76
End: 2021-10-14

## 2021-10-14 NOTE — PAYOR COMM NOTE
PLEASE FAX DETERMINATION    DISCHARGE REVIEW    Payor: [id number] #:  [id number]  Authorization Number: [id number]    Admit date: 10/7/21  Admit time:   6:59 PM  Discharge Date: 10/13/2021  4:19 PM     Admitting Physician: Gem Mead of bed. Patient reports fever and chills currently no nausea or vomiting patient does have a headache. No chest pain or shortness of breath. No dizziness, lightheadedness, or syncope.     Brief Synopsis: admitted and treated for right odontogenic infecti breakfast.   Refills: 0     hydrochlorothiazide 12.5 MG Tabs  Commonly known as: HYDRODIURIL      Take 12.5 mg by mouth daily. Refills: 0     Janumet  MG Tabs  Generic drug: SITagliptin-metFORMIN HCl      Take 1 tablet by mouth daily.    Refills: 0 General: No acute distress. Respiratory: Clear to auscultation bilaterally. No wheezes. No rhonchi. Cardiovascular: S1, S2. Regular rate and rhythm. No murmurs, rubs or gallops. Abdomen: Soft, nontender, nondistended.     Neurologic: No focal neurolo

## 2021-10-14 NOTE — PROGRESS NOTES
NC s/w patient's daughter Sarai Garrett who states that patient has an appt with his PCP Dr. Basia Chiang with [facility] tomorrow, 10/15/21. She requested [facility] cancel [facility] appt, which I did. NC closing encounter.

## 2021-10-15 ENCOUNTER — OFFICE VISIT (OUTPATIENT)
Dept: INTERNAL MEDICINE | Age: 76
End: 2021-10-15

## 2021-10-15 VITALS
TEMPERATURE: 98.6 F | RESPIRATION RATE: 16 BRPM | WEIGHT: 169 LBS | SYSTOLIC BLOOD PRESSURE: 170 MMHG | OXYGEN SATURATION: 97 % | DIASTOLIC BLOOD PRESSURE: 92 MMHG | HEIGHT: 72 IN | HEART RATE: 51 BPM | BODY MASS INDEX: 22.89 KG/M2

## 2021-10-15 DIAGNOSIS — R53.1 WEAKNESS: ICD-10-CM

## 2021-10-15 DIAGNOSIS — R40.0 SOMNOLENCE: ICD-10-CM

## 2021-10-15 DIAGNOSIS — R11.2 NAUSEA AND VOMITING, INTRACTABILITY OF VOMITING NOT SPECIFIED, UNSPECIFIED VOMITING TYPE: ICD-10-CM

## 2021-10-15 DIAGNOSIS — G44.89 OTHER HEADACHE SYNDROME: Primary | ICD-10-CM

## 2021-10-15 PROCEDURE — 99203 OFFICE O/P NEW LOW 30 MIN: CPT | Performed by: INTERNAL MEDICINE

## 2021-10-15 RX ORDER — SIMVASTATIN 20 MG
20 TABLET ORAL NIGHTLY
COMMUNITY

## 2021-10-15 RX ORDER — HYDROCHLOROTHIAZIDE 12.5 MG/1
12.5 TABLET ORAL DAILY
COMMUNITY

## 2021-10-15 RX ORDER — AMLODIPINE BESYLATE 10 MG/1
10 TABLET ORAL DAILY
COMMUNITY
Start: 2021-10-13

## 2021-10-15 RX ORDER — AMOXICILLIN AND CLAVULANATE POTASSIUM 875; 125 MG/1; MG/1
1 TABLET, FILM COATED ORAL 2 TIMES DAILY
COMMUNITY

## 2021-10-15 RX ORDER — GLIMEPIRIDE 1 MG/1
TABLET ORAL EVERY 24 HOURS
COMMUNITY

## 2021-10-18 ASSESSMENT — ENCOUNTER SYMPTOMS
NAUSEA: 0
CONSTIPATION: 0
WEAKNESS: 0
COUGH: 0
ABDOMINAL PAIN: 0
LIGHT-HEADEDNESS: 0
FATIGUE: 0
BLOOD IN STOOL: 0
EYE PAIN: 0
EYE DISCHARGE: 0
EYE REDNESS: 0
DIZZINESS: 0
APPETITE CHANGE: 0
NERVOUS/ANXIOUS: 0
FEVER: 0
SINUS PRESSURE: 0
UNEXPECTED WEIGHT CHANGE: 0
CHEST TIGHTNESS: 0
WHEEZING: 0
HEADACHES: 0
TROUBLE SWALLOWING: 0
ABDOMINAL DISTENTION: 0
COLOR CHANGE: 0
BACK PAIN: 0
NUMBNESS: 0
SHORTNESS OF BREATH: 0
SORE THROAT: 0
DIARRHEA: 0

## 2021-10-21 NOTE — CDS QUERY
Uncertain Diagnosis  CLINICAL DOCUMENTATION CLARIFICATION FORM    Dear Doctor:  Clinical information (provided below) indicates an unknown status of a documented diagnosis.  For accurate ICD-10-CM code assignment to reflect severity of illness and risk of m now. 4.Fever/ sepsis due to above  5. HA- assume due to #1  10/9 PN: Dental infection-we will continue on Unasyn. CT scan negative for abscess orosteomyelitis. 10/10 PN Robert Elise):  1.R odontogenic infection- no jace abscess per imaging but does not rule it o

## 2022-05-17 ENCOUNTER — HOSPITAL ENCOUNTER (OUTPATIENT)
Dept: GENERAL RADIOLOGY | Age: 77
Discharge: HOME | End: 2022-05-17
Attending: FAMILY MEDICINE

## 2022-05-17 ENCOUNTER — WALK IN (OUTPATIENT)
Dept: URGENT CARE | Age: 77
End: 2022-05-17
Attending: FAMILY MEDICINE

## 2022-05-17 VITALS
RESPIRATION RATE: 18 BRPM | SYSTOLIC BLOOD PRESSURE: 144 MMHG | HEART RATE: 59 BPM | BODY MASS INDEX: 24.68 KG/M2 | DIASTOLIC BLOOD PRESSURE: 71 MMHG | WEIGHT: 182 LBS | TEMPERATURE: 98.1 F | OXYGEN SATURATION: 97 %

## 2022-05-17 DIAGNOSIS — M79.672 LEFT FOOT PAIN: ICD-10-CM

## 2022-05-17 DIAGNOSIS — M79.672 LEFT FOOT PAIN: Primary | ICD-10-CM

## 2022-05-17 PROCEDURE — 99212 OFFICE O/P EST SF 10 MIN: CPT

## 2022-05-17 PROCEDURE — 73630 X-RAY EXAM OF FOOT: CPT

## 2022-05-17 RX ORDER — TRAMADOL HYDROCHLORIDE 50 MG/1
50 TABLET ORAL EVERY 8 HOURS PRN
Qty: 15 TABLET | Refills: 0 | Status: SHIPPED | OUTPATIENT
Start: 2022-05-17

## 2022-05-17 ASSESSMENT — PAIN SCALES - GENERAL
PAINLEVEL: 6
PAINLEVEL_OUTOF10: 2

## 2022-05-18 ASSESSMENT — ENCOUNTER SYMPTOMS
ADENOPATHY: 0
VOMITING: 0
WEAKNESS: 0
ABDOMINAL PAIN: 0
AGITATION: 0
NUMBNESS: 0
FEVER: 0
NAUSEA: 0

## 2023-02-28 ENCOUNTER — PATIENT OUTREACH (OUTPATIENT)
Dept: CASE MANAGEMENT | Age: 78
End: 2023-02-28

## 2023-02-28 NOTE — PROCEDURES
The office order for PCP removal request is Approved and finalized on February 28, 2023.     Thanks,  [facility] [employer]

## 2024-05-12 PROBLEM — E78.2 MIXED HYPERLIPIDEMIA: Status: ACTIVE | Noted: 2024-05-12

## 2024-05-12 PROBLEM — E11.65 TYPE 2 DIABETES MELLITUS WITH HYPERGLYCEMIA  (CMD): Status: ACTIVE | Noted: 2024-05-12

## 2024-05-12 PROBLEM — I10 BENIGN ESSENTIAL HYPERTENSION: Chronic | Status: ACTIVE | Noted: 2024-05-12

## 2024-07-02 PROBLEM — D17.0 LIPOMA OF FOREHEAD: Status: ACTIVE | Noted: 2024-07-02

## 2025-07-07 PROBLEM — Z00.01 ANNUAL VISIT FOR GENERAL ADULT MEDICAL EXAMINATION WITH ABNORMAL FINDINGS: Status: ACTIVE | Noted: 2025-07-07

## 2025-07-07 PROBLEM — M1A.2720 DRUG-INDUCED CHRONIC GOUT OF LEFT FOOT WITHOUT TOPHUS: Status: ACTIVE | Noted: 2025-07-07

## 2025-07-07 PROBLEM — R00.1 SINUS BRADYCARDIA: Status: ACTIVE | Noted: 2025-07-07

## 2025-07-07 PROBLEM — M79.672 LEFT FOOT PAIN: Status: ACTIVE | Noted: 2025-07-07

## 2025-07-08 ENCOUNTER — HOSPITAL ENCOUNTER (OUTPATIENT)
Dept: GENERAL RADIOLOGY | Age: 80
Discharge: HOME | End: 2025-07-08

## 2025-07-08 DIAGNOSIS — M1A.2720 DRUG-INDUCED CHRONIC GOUT OF LEFT FOOT WITHOUT TOPHUS: ICD-10-CM

## 2025-07-08 DIAGNOSIS — M79.672 LEFT FOOT PAIN: ICD-10-CM

## 2025-07-08 PROCEDURE — 73630 X-RAY EXAM OF FOOT: CPT
